# Patient Record
Sex: FEMALE | Race: WHITE | NOT HISPANIC OR LATINO | URBAN - METROPOLITAN AREA
[De-identification: names, ages, dates, MRNs, and addresses within clinical notes are randomized per-mention and may not be internally consistent; named-entity substitution may affect disease eponyms.]

---

## 2017-09-19 NOTE — PATIENT DISCUSSION
The IOP is in the target range. The patient will continue the current management. Use Pred TID x 2 weeks then BID.

## 2017-10-17 NOTE — PATIENT DISCUSSION
The IOP is in the target range. The patient will continue the current management. Use Pred TIDas directed.

## 2018-08-07 NOTE — PATIENT DISCUSSION
Caused by amount of drops used OD. May need surgery in future to correct. Will not correct on it's own.

## 2018-09-10 NOTE — PATIENT DISCUSSION
[facility]

## 2019-03-21 NOTE — PATIENT DISCUSSION
Return care to \[facility] [place] [facility]\"" w/ yearly OCT, disc photos and HVF.  If changes, refer back to [address].

## 2019-10-23 NOTE — PATIENT DISCUSSION
Monitor. Detail Level: Detailed Procedure To Be Performed At Next Visit: Biopsy by shave method Introduction Text (Please End With A Colon): The following procedure was deferred:

## 2021-03-01 ENCOUNTER — PREPPED CHART (OUTPATIENT)
Dept: URBAN - METROPOLITAN AREA CLINIC 16 | Facility: CLINIC | Age: 41
End: 2021-03-01

## 2021-12-13 NOTE — PATIENT DISCUSSION
Caused by amount of drops used OD. May need surgery in future to correct. Will not correct on it's own. General

## 2022-03-01 ASSESSMENT — TONOMETRY
OS_IOP_MMHG: 12
OD_IOP_MMHG: 12

## 2022-03-02 ENCOUNTER — COMPREHENSIVE EXAM (OUTPATIENT)
Dept: URBAN - METROPOLITAN AREA CLINIC 16 | Facility: CLINIC | Age: 42
End: 2022-03-02

## 2022-03-02 DIAGNOSIS — Z01.00: ICD-10-CM

## 2022-03-02 DIAGNOSIS — H04.123: ICD-10-CM

## 2022-03-02 DIAGNOSIS — H52.11: ICD-10-CM

## 2022-03-02 DIAGNOSIS — H17.9: ICD-10-CM

## 2022-03-02 PROCEDURE — 92014 COMPRE OPH EXAM EST PT 1/>: CPT

## 2022-03-02 PROCEDURE — 92015 DETERMINE REFRACTIVE STATE: CPT

## 2022-03-02 ASSESSMENT — KERATOMETRY
OS_AXISANGLE_DEGREES: 175
OS_K2POWER_DIOPTERS: 40.25
OD_K1POWER_DIOPTERS: 39.75
OD_K2POWER_DIOPTERS: 40.50
OD_AXISANGLE2_DEGREES: 72
OS_AXISANGLE2_DEGREES: 85
OS_K1POWER_DIOPTERS: 39.50
OD_AXISANGLE_DEGREES: 162

## 2022-03-02 ASSESSMENT — TONOMETRY
OD_IOP_MMHG: 13
OS_IOP_MMHG: 13

## 2022-03-02 ASSESSMENT — VISUAL ACUITY
OD_SC: 20/20-2
OS_SC: 20/20-2

## 2022-06-18 RX ORDER — NORETHINDRONE ACETATE AND ETHINYL ESTRADIOL 1; .02 MG/1; MG/1
TABLET ORAL
COMMUNITY

## 2022-06-18 RX ORDER — ACETAMINOPHEN AND CODEINE PHOSPHATE 120; 12 MG/5ML; MG/5ML
SOLUTION ORAL
COMMUNITY

## 2022-06-18 RX ORDER — RIZATRIPTAN BENZOATE 10 MG/1
TABLET ORAL
COMMUNITY

## 2022-06-18 RX ORDER — DEXTROAMPHETAMINE SACCHARATE, AMPHETAMINE ASPARTATE, DEXTROAMPHETAMINE SULFATE AND AMPHETAMINE SULFATE 3.75; 3.75; 3.75; 3.75 MG/1; MG/1; MG/1; MG/1
TABLET ORAL
COMMUNITY

## 2025-08-20 ENCOUNTER — COMPREHENSIVE EXAM (OUTPATIENT)
Age: 45
End: 2025-08-20

## 2025-08-20 DIAGNOSIS — H04.123: ICD-10-CM

## 2025-08-20 DIAGNOSIS — H52.13: ICD-10-CM

## 2025-08-20 PROCEDURE — 92015 DETERMINE REFRACTIVE STATE: CPT

## 2025-08-20 PROCEDURE — 92014 COMPRE OPH EXAM EST PT 1/>: CPT
